# Patient Record
Sex: MALE | Race: BLACK OR AFRICAN AMERICAN
[De-identification: names, ages, dates, MRNs, and addresses within clinical notes are randomized per-mention and may not be internally consistent; named-entity substitution may affect disease eponyms.]

---

## 2018-04-04 ENCOUNTER — HOSPITAL ENCOUNTER (EMERGENCY)
Dept: HOSPITAL 45 - C.EDB | Age: 28
Discharge: HOME | End: 2018-04-04
Payer: COMMERCIAL

## 2018-04-04 VITALS
HEIGHT: 67.99 IN | WEIGHT: 178.79 LBS | BODY MASS INDEX: 27.1 KG/M2 | BODY MASS INDEX: 27.1 KG/M2 | HEIGHT: 67.99 IN | WEIGHT: 178.79 LBS

## 2018-04-04 VITALS — HEART RATE: 74 BPM | OXYGEN SATURATION: 100 % | SYSTOLIC BLOOD PRESSURE: 149 MMHG | DIASTOLIC BLOOD PRESSURE: 73 MMHG

## 2018-04-04 VITALS — TEMPERATURE: 98.42 F

## 2018-04-04 DIAGNOSIS — Z82.49: ICD-10-CM

## 2018-04-04 DIAGNOSIS — Z84.1: ICD-10-CM

## 2018-04-04 DIAGNOSIS — M76.892: ICD-10-CM

## 2018-04-04 DIAGNOSIS — J45.909: ICD-10-CM

## 2018-04-04 DIAGNOSIS — M76.891: Primary | ICD-10-CM

## 2018-04-04 DIAGNOSIS — Z83.3: ICD-10-CM

## 2018-04-04 DIAGNOSIS — F17.200: ICD-10-CM

## 2018-04-04 NOTE — DIAGNOSTIC IMAGING REPORT
R KNEE 3 VIEWS



CLINICAL HISTORY: Bilateral knee pain.    



COMPARISON: None



FINDINGS:  Alignment of the right knee is anatomic. There is no acute fracture

or suspicious lesion. There is minimal spurring of the superior patella at the

insertion of the quadriceps. There is no joint effusion.



IMPRESSION: 



1. No acute fracture or joint effusion of the right knee.



2. Minimal superior patellar spurring at the insertion of the quadriceps.







Electronically signed by:  Antoine Zaidi M.D.

4/4/2018 10:13 AM



Dictated Date/Time:  4/4/2018 10:12 AM

## 2018-04-04 NOTE — DIAGNOSTIC IMAGING REPORT
L KNEE 3 VIEWS



CLINICAL HISTORY: Bilateral knee pain.    



COMPARISON: None



FINDINGS:  Alignment of the left knee is anatomic. There is no fracture or

suspicious lesion. The joint spaces are preserved. There is minimal

osteophytosis of the left knee and minimal superior patellar spurring at the

insertion of the quadriceps. No joint effusion is present.



IMPRESSION: 



1. No acute fracture.



2. Preserved joint spaces left knee with minimal medial compartment

osteophytosis.







Electronically signed by:  Antoine Zaidi M.D.

4/4/2018 10:14 AM



Dictated Date/Time:  4/4/2018 10:13 AM

## 2018-04-04 NOTE — EMERGENCY ROOM VISIT NOTE
History


First contact with patient:  09:08


Chief Complaint:  KNEEPAIN


Stated Complaint:  KNEE HURTS





History of Present Illness


The patient is a 27 year old male who presents to the Emergency Room with 

complaints of bilateral knee pain.  She reports the pain is mostly over the 

inner aspect of both knees.  The patient reports that he works in the 

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 

natural disasters.  The patient reports that he does spend a lot of time 

kneeling, squatting and crawling on his knees.  The patient reports that he was 

seen by a PCP in the past that diagnosed him with tendinitis.  He denies any 

specific injuries to his knees in the past.  He rates his discomfort a 6 out of 

10.  He denies any instability, clicking or locking of the knees.





Review of Systems


10 system review was performed and was negative except for pertinent positives 

and negatives as indicated in history of present illness





Past Medical/Surgical History


Medical Problems:


(1) Asthma


(2) Pain, dental








Family History





FH: cancer


FH: diabetes mellitus


FH: heart disease


FH: hypertension


FH: kidney disease





Social History


Smoking Status:  Current Every Day Smoker


Alcohol Use:  occasionally


Marital Status:  


Occupation Status:  employed





Current/Historical Medications


Scheduled


Ibuprofen Tab (Advil), 400-600 MG PO Q6H





Physical Exam


Vital Signs











  Date Time  Temp Pulse Resp B/P (MAP) Pulse Ox O2 Delivery O2 Flow Rate FiO2


 


4/4/18 10:34  74 18 149/73 100 Room Air  


 


4/4/18 09:00 36.9 93 18 129/80 98 Room Air  











Physical Exam


CONSTITUTIONAL:  Healthy and well nourished.  Alert and oriented X 3 with 

positive affect.  Patient does not appear in any acute distress.


HEENT:  Normocephalic, atraumatic.  Pupils equal, round and reactive.  


NECK:  Full active range of motion without discomfort.


MUSCULOSKELETAL: Examination of bilateral knees does not show any areas of edema

, ecchymosis or skin conditions.  The patient has tenderness to palpation over 

bilateral pedis anserine regions, and through the posterior hamstrings.  The 

patient has no focal tenderness to palpation over the medial or lateral joint 

line, peripatellar region, quadriceps/patellar tendons or popliteal space.  No 

joint effusion noted.  The patient has full active flexion and extension 

without discomfort.  Ligamentous exam is normal.  Pedal pulses are intact.


INTEGUMENTARY:  No rash or other significant dermatologic conditions noted.


NEUROLOGIC:  No focal neurologic deficits noted.





Medical Decision & Procedures


ER Provider


Diagnostic Interpretation:


My interpretation of bilateral knee x-rays shows mild degenerative changes as 

discussed below, otherwise no other acute fractures, dislocation or joint 

effusion.  Radiologist reports are as follows:





L KNEE 3 VIEWS





CLINICAL HISTORY: Bilateral knee pain.    





COMPARISON: None





FINDINGS:  Alignment of the left knee is anatomic. There is no fracture or


suspicious lesion. The joint spaces are preserved. There is minimal


osteophytosis of the left knee and minimal superior patellar spurring at the


insertion of the quadriceps. No joint effusion is present.





IMPRESSION: 





1. No acute fracture.





2. Preserved joint spaces left knee with minimal medial compartment


osteophytosis.





===========================================








R KNEE 3 VIEWS





CLINICAL HISTORY: Bilateral knee pain.    





COMPARISON: None





FINDINGS:  Alignment of the right knee is anatomic. There is no acute fracture


or suspicious lesion. There is minimal spurring of the superior patella at the


insertion of the quadriceps. There is no joint effusion.





IMPRESSION: 





1. No acute fracture or joint effusion of the right knee.





2. Minimal superior patellar spurring at the insertion of the quadriceps.





ED Course


Patient history and physical exam were performed.  Nurse's notes were reviewed.

  Vital signs were reviewed and were normal.  The patient refused any 

analgesics.  X-rays of bilateral knees were normal.  The patient was advised 

that his clinical exams are consistent with bilateral knee tendinitis.  He was 

encouraged to intermittently apply ice to areas of discomfort.  The patient 

refused crutches.  He was encouraged to follow-up with his PCP or orthopedics 

for further reevaluation and management.  The patient was happy with plan of 

care, voiced understanding of all discharge instructions, and rated his 

discomfort a 4 out of 10 at the conclusion of my exam.





Medical Decision








Medication Reconcilliation


Current Medication List:  was personally reviewed by me





Blood Pressure Screening


Patient's blood pressure:  Normal blood pressure





Impression





 Primary Impression:  


 Bilateral knee tendinitis





Departure Information


Referrals


No Doctor, Assigned (PCP)





Patient Instructions


My [facility]